# Patient Record
Sex: FEMALE | Race: OTHER
[De-identification: names, ages, dates, MRNs, and addresses within clinical notes are randomized per-mention and may not be internally consistent; named-entity substitution may affect disease eponyms.]

---
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
## 2019-01-04 ENCOUNTER — HOSPITAL ENCOUNTER (EMERGENCY)
Dept: HOSPITAL 61 - ER | Age: 19
Discharge: HOME | End: 2019-01-04
Payer: COMMERCIAL

## 2019-01-04 ENCOUNTER — HOSPITAL ENCOUNTER (OUTPATIENT)
Dept: HOSPITAL 61 - 3 SO LND | Age: 19
Setting detail: OBSERVATION
Discharge: HOME | End: 2019-01-04
Attending: SPECIALIST | Admitting: SPECIALIST
Payer: COMMERCIAL

## 2019-01-04 VITALS — DIASTOLIC BLOOD PRESSURE: 63 MMHG | SYSTOLIC BLOOD PRESSURE: 135 MMHG

## 2019-01-04 VITALS — BODY MASS INDEX: 37 KG/M2 | WEIGHT: 196 LBS | HEIGHT: 61 IN

## 2019-01-04 DIAGNOSIS — B37.9: ICD-10-CM

## 2019-01-04 DIAGNOSIS — Z3A.27: ICD-10-CM

## 2019-01-04 DIAGNOSIS — B96.89: ICD-10-CM

## 2019-01-04 DIAGNOSIS — N76.0: ICD-10-CM

## 2019-01-04 DIAGNOSIS — O23.42: Primary | ICD-10-CM

## 2019-01-04 DIAGNOSIS — O98.812: ICD-10-CM

## 2019-01-04 DIAGNOSIS — N89.8: ICD-10-CM

## 2019-01-04 DIAGNOSIS — O26.892: Primary | ICD-10-CM

## 2019-01-04 DIAGNOSIS — O23.592: ICD-10-CM

## 2019-01-04 LAB
AMPHETAMINE/METHAMPHETAMINE: (no result)
APTT PPP: YELLOW S
APTT PPP: YELLOW S
BACTERIA #/AREA URNS HPF: (no result) /HPF
BACTERIA #/AREA URNS HPF: (no result) /HPF
BARBITURATES UR-MCNC: (no result) UG/ML
BENZODIAZ UR-MCNC: (no result) UG/L
BILIRUB UR QL STRIP: NEGATIVE
BILIRUB UR QL STRIP: NEGATIVE
CANNABINOIDS UR-MCNC: (no result) UG/L
COCAINE UR-MCNC: (no result) NG/ML
FIBRINOGEN PPP-MCNC: CLEAR MG/DL
FIBRINOGEN PPP-MCNC: CLEAR MG/DL
METHADONE SERPL-MCNC: (no result) NG/ML
NITRITE UR QL STRIP: NEGATIVE
NITRITE UR QL STRIP: POSITIVE
OPIATES UR-MCNC: (no result) NG/ML
PCP SERPL-MCNC: (no result) MG/DL
PH UR STRIP: 6.5 [PH]
PH UR STRIP: 7 [PH]
PROT UR STRIP-MCNC: NEGATIVE MG/DL
PROT UR STRIP-MCNC: NEGATIVE MG/DL
RBC #/AREA URNS HPF: (no result) /HPF (ref 0–2)
RBC #/AREA URNS HPF: (no result) /HPF (ref 0–2)
SQUAMOUS #/AREA URNS LPF: (no result) /LPF
SQUAMOUS #/AREA URNS LPF: (no result) /LPF
UROBILINOGEN UR-MCNC: 0.2 MG/DL
UROBILINOGEN UR-MCNC: 0.2 MG/DL

## 2019-01-04 PROCEDURE — 81001 URINALYSIS AUTO W/SCOPE: CPT

## 2019-01-04 PROCEDURE — 80307 DRUG TEST PRSMV CHEM ANLYZR: CPT

## 2019-01-04 PROCEDURE — 81025 URINE PREGNANCY TEST: CPT

## 2019-01-04 PROCEDURE — 87086 URINE CULTURE/COLONY COUNT: CPT

## 2019-01-04 PROCEDURE — 87591 N.GONORRHOEAE DNA AMP PROB: CPT

## 2019-01-04 PROCEDURE — 87491 CHLMYD TRACH DNA AMP PROBE: CPT

## 2019-01-04 PROCEDURE — 87186 SC STD MICRODIL/AGAR DIL: CPT

## 2019-01-04 PROCEDURE — G0379 DIRECT REFER HOSPITAL OBSERV: HCPCS

## 2019-01-04 PROCEDURE — G0378 HOSPITAL OBSERVATION PER HR: HCPCS

## 2019-01-04 PROCEDURE — 99283 EMERGENCY DEPT VISIT LOW MDM: CPT

## 2019-01-04 NOTE — PHYS DOC
Past Medical History


Past Medical History:  No Pertinent History


Past Surgical History:  No Surgical History


Alcohol Use:  None


Drug Use:  None





Adult General


Chief Complaint


Chief Complaint:  VAGINAL PROBLEM





HPI


HPI





Patient is a 18  year old female with no significant medical history  1 

para 0 currently 27 weeks pregnant presenting to the ED today complaining of 

vaginal itching and thick white discharge for 2 days. Patient denies any 

concerns for STDs. She states she follows up with her OB/GYN for her pregnancy. 

Denies any abdominal pain, nausea or vomiting.





Review of Systems


Review of Systems





Constitutional: Denies fever or chills []


Eyes: Denies change in visual acuity, redness, or eye pain []


HENT: Denies nasal congestion or sore throat []


Respiratory: Denies cough or shortness of breath []


Cardiovascular: No additional information not addressed in HPI []


GI: Denies abdominal pain, nausea, vomiting, bloody stools or diarrhea []


: Reports vaginal itching and thick white vaginal discharge. Denies dysuria 

or hematuria []


Musculoskeletal: Denies back pain or joint pain []


Integument: Denies rash or skin lesions []


Neurologic: Denies headache, focal weakness or sensory changes []








All other systems were reviewed and found to be within normal limits, except as 

documented in this note.





Allergies


Allergies





Allergies








Coded Allergies Type Severity Reaction Last Updated Verified


 


  No Known Drug Allergies    4/20/15 No











Physical Exam


Physical Exam





Constitutional: Well developed, well nourished, no acute distress, non-toxic 

appearance. []


HENT: Normocephalic, atraumatic, bilateral external ears normal, oropharynx 

moist, no oral exudates, nose normal. []


Eyes: PERRLA, EOMI, conjunctiva normal, no discharge. [] 


Neck: Normal range of motion, no tenderness, supple, no stridor. [] 


Cardiovascular:Heart rate regular rhythm, no murmur []


Lungs & Thorax:  Bilateral breath sounds clear to auscultation []


Abdomen: Gravid abdomen. Bowel sounds normal, soft, no tenderness, no masses, 

no pulsatile masses. [] 


Pelvic exam


External pelvic appears normal, cervix is visualized and closed, moderate 

amount of fecal white discharge in the vaginal vault, no adnexal tenderness, no 

CMT.


Skin: Warm, dry, no erythema, no rash. [] 


Back: No tenderness, no CVA tenderness. [] 


Extremities: No tenderness, no cyanosis, no clubbing, ROM intact, no edema. [] 


Neurologic: Alert and oriented X 3, normal motor function, normal sensory 

function, no focal deficits noted. []


Psychologic: Affect normal, judgement normal, mood normal. []





Current Patient Data


Vital Signs





 Vital Signs








  Date Time  Temp Pulse Resp B/P (MAP) Pulse Ox O2 Delivery O2 Flow Rate FiO2


 


19 22:00 98.1 123 18 135/63 (87) 99 Room Air  





 98.1       








Lab Values





 Laboratory Tests








Test


 19


21:55 19


22:05


 


Urine Collection Type Unknown   


 


Urine Color Yellow   


 


Urine Clarity Clear   


 


Urine pH 6.5   


 


Urine Specific Gravity 1.010   


 


Urine Protein


 Negative mg/dL


(NEG-TRACE) 





 


Urine Glucose (UA)


 Negative mg/dL


(NEG) 





 


Urine Ketones (Stick)


 40 mg/dL (NEG)


 





 


Urine Blood Trace (NEG)   


 


Urine Nitrite


 Positive (NEG)


 





 


Urine Bilirubin


 Negative (NEG)


 





 


Urine Urobilinogen Dipstick


 0.2 mg/dL (0.2


mg/dL) 





 


Urine Leukocyte Esterase Large (NEG)   


 


Urine RBC


 Occ /HPF (0-2)


 





 


Urine WBC


 11-20 /HPF


(0-4) 





 


Urine Squamous Epithelial


Cells Mod /LPF  


 





 


Urine Bacteria


 Many /HPF


(0-FEW) 





 


POC Urine HCG, Qualitative


 


 Hcg positive


(Negative)








Microbiology


19 Wet Prep - Final, Complete


         








EKG


EKG


[]





Radiology/Procedures


Radiology/Procedures


[]





Course & Med Decision Making


Course & Med Decision Making


Pertinent Labs and Imaging studies reviewed. (See chart for details)





This is a 18-year-old female patient currently 27 weeks pregnant presenting to 

the ED today with vaginal discharge and itching for 2 days. Wet prep noted for 

BV, also noted for yeast infection. Urine analysis is noted for UTI. Discharged 

with cephalexin. Discharged with Flagyl. Patient instructed to increase dietary 

culture intake including yogurt and consider taking over-the-counter probiotics 

that is safe in pregnancy, also instructed to use over-the-counter Monistat. 

Follow-up with OB/GYN next week.





Dragon Disclaimer


Dragon Disclaimer


This electronic medical record was generated, in whole or in part, using a 

voice recognition dictation system.





Departure


Departure


Impression:  


 Primary Impression:  


 Urinary tract infection


 Additional Impressions:  


 Bacterial vaginosis


 Yeast infection


Disposition:   HOME, SELF-CARE


Condition:  STABLE


Referrals:  


ELAINA LUZ (PCP)


follow up next week with your OBGYN


Patient Instructions:  Bacterial Vaginosis, Easy-to-Read, Candida Infection, 

Adult, Urinary Tract Infection





Additional Instructions:  


You were evaluated in the emergency room and noted to have urinary tract 

infection, yeast infection and bacterial vaginosis. We put you on antibiotics 

for UTI and Bacterial vaginosis. For the yeast infection we highly recommend 

you increase your dietary culture intake through foods like yogurt. Also use 

over-the-counter Monistat to clear the yeast infection. Complete the 

antibiotics prescribed. Follow-up with your OB/GYN next week.


Scripts


Metronidazole (FLAGYL) 500 Mg Tablet


1 TAB PO BID, #14 TAB


   Prov: VINNY MCNEILL         19 


Cephalexin (CEPHALEXIN) 500 Mg Tablet


1 TAB PO BID, #14 TAB


   Prov: VINNY MCNEILL         19 


Miconazole/Skin Cleanser No.17 (MONISTAT 7 COMBINATION PACK) 1 Each Kit


1 EACH VG DAILY, #7 EACH


   Prov: VINNY MCNEILL         19





Problem Qualifiers








 Primary Impression:  


 Urinary tract infection


 Urinary tract infection type:  site unspecified  Hematuria presence:  without 

hematuria  Qualified Codes:  N39.0 - Urinary tract infection, site not specified








VINNY MCNEILL 2019 22:45

## 2019-07-14 ENCOUNTER — HOSPITAL ENCOUNTER (EMERGENCY)
Dept: HOSPITAL 61 - ER | Age: 19
Discharge: HOME | End: 2019-07-14
Payer: MEDICAID

## 2019-07-14 VITALS — DIASTOLIC BLOOD PRESSURE: 68 MMHG | SYSTOLIC BLOOD PRESSURE: 134 MMHG

## 2019-07-14 VITALS — WEIGHT: 196 LBS | BODY MASS INDEX: 37 KG/M2 | HEIGHT: 61 IN

## 2019-07-14 DIAGNOSIS — R20.2: Primary | ICD-10-CM

## 2019-07-14 DIAGNOSIS — R51: ICD-10-CM

## 2019-07-14 DIAGNOSIS — R20.0: ICD-10-CM

## 2019-07-14 PROCEDURE — 72125 CT NECK SPINE W/O DYE: CPT

## 2019-07-14 PROCEDURE — 99284 EMERGENCY DEPT VISIT MOD MDM: CPT

## 2019-07-14 PROCEDURE — 70450 CT HEAD/BRAIN W/O DYE: CPT

## 2019-07-14 PROCEDURE — 81025 URINE PREGNANCY TEST: CPT

## 2019-07-14 NOTE — RAD
Examination: CT head and cervical spine without contrast

 

HISTORY: History of bilateral hand numbness

 

 

CT HEAD

 

 

 

COMPARISON: None Available.

 

Exposure: One or more of the following individualized dose reduction 

techniques were utilized for this examination:  1. Automated exposure 

control  2. Adjustment of the mA and/or kV according to patient size  3. 

Use of iterative reconstruction technique

 

TECHNIQUE: 5 mm contiguous axial images were obtained from the skull base 

to the vertex in both bone and soft tissue algorithm.  

 

FINDINGS: 

No abnormal attenuation within the brain parenchyma.  

 

No evidence of acute intracranial hemorrhage.   No extra-axial fluid 

collections.

 

No mass effect or midline shift. Ventricular size is appropriate.  Basal 

cisterns are patent.

 

No fractures identified.Gray-white differentiation is preserved.Globes and

orbits are within normal limits.   Paranasal sinuses and mastoid air cells

are clear.   

 

IMPRESSION:

 

No acute intracranial findings. 

 

 

CT CERVICAL SPINE

 

 

 

COMPARISON: None Available.

 

Technique: 2.5 mm contiguous axial images were obtained from the skull 

base through the cervicothoracic junction in both bone and soft tissue 

algorithm.  Additional sagittal and coronal reconstructions were also 

performed. 

 

FINDINGS: Vertebral body height and alignment are maintained.  Cervical 

lordosis is preserved.  The lateral masses of C1 are aligned upon C2.  

 

No fractures identified.  The bony canal is patent throughout.

 

No significant degenerative changes are identified.  

 

The paraspinous soft tissues are unremarkable.  Visualized intracranial 

contents are unremarkable.  Lung apices are clear. 

 

IMPRESSION:

Unremarkable CT examination of the cervical spine, as above.  

Specifically, no fractures are seen.  

 

Electronically signed by: Marv Albright MD (7/14/2019 12:38 PM) [facility]

## 2019-07-14 NOTE — PHYS DOC
Past Medical History


Past Medical History:  No Pertinent History


Past Surgical History:  No Surgical History


Alcohol Use:  None


Drug Use:  None





Adult General


Chief Complaint


Chief Complaint:  HAND PROBLEM





HPI


HPI





Patient is a 18  year old female who presents with [pinning of hands numbness. 

Patient complaining of numbness of bilateral tip of fingers for 3 weeks that 

usually happens when she using her hands and resolved with rest. Patient denies 

weakness, nausea and vomiting, fever and chills, headache, pregnancy.





Review of Systems


Review of Systems





Constitutional: Denies fever or chills []


Eyes: Denies change in visual acuity, redness, or eye pain []


HENT: Denies nasal congestion or sore throat []


Respiratory: Denies cough or shortness of breath []


Cardiovascular: No additional information not addressed in HPI []


GI: Denies abdominal pain, nausea, vomiting, bloody stools or diarrhea []


: Denies dysuria or hematuria []


Musculoskeletal: Denies back pain or joint pain []


Integument: Denies rash or skin lesions []


Neurologic: Denies headache, focal weakness or sensory changes []


Endocrine: Denies polyuria or polydipsia []





All other systems were reviewed and found to be within normal limits, except as 

documented in this note.





Allergies


Allergies





Allergies








Coded Allergies Type Severity Reaction Last Updated Verified


 


  No Known Drug Allergies    4/20/15 No











Physical Exam


Physical Exam





Constitutional: Well developed, well nourished, no acute distress, non-toxic 

appearance. []


HENT: Normocephalic, atraumatic, bilateral external ears normal, oropharynx 

moist, no oral exudates, nose normal. []


Eyes: PERRLA, EOMI, conjunctiva normal, no discharge. [] 


Neck: Normal range of motion, no tenderness, supple, no stridor. [] 


Cardiovascular:Heart rate regular rhythm, no murmur []


Lungs & Thorax:  Bilateral breath sounds clear to auscultation []


Abdomen: Bowel sounds normal, soft, no tenderness, no masses, no pulsatile 

masses. [] 


Skin: Warm, dry, no erythema, no rash. [] 


Back: No tenderness, no CVA tenderness. [] 


Extremities: No tenderness, no cyanosis, no clubbing, ROM intact, no edema. [] 


Neurologic: Alert and oriented X 3, normal motor function, normal sensory 

function, no focal deficits noted. []


Psychologic: Affect normal, judgement normal, mood normal. []





Current Patient Data


Vital Signs





                                   Vital Signs








  Date Time  Temp Pulse Resp B/P (MAP) Pulse Ox O2 Delivery O2 Flow Rate FiO2


 


19 11:14 98.2 102 16 134/68 (90) 96 Room Air  





 98.2       








Lab Values





                                Laboratory Tests








Test


 19


12:06


 


POC Urine HCG, Qualitative


 Hcg negative


(Negative)











EKG


EKG


[]





Radiology/Procedures


Radiology/Procedures


                            [facility]


                    [address] [place] [address]


                                 [phone]


                                        


                                 IMAGING REPORT





                                     Signed





PATIENT: MOE TINEO   ACCOUNT: [id number]     MRN#: [id number]


: 2000           LOCATION: ER              AGE: 18


SEX: F                    EXAM DT: 19         ACCESSION#: [id number]


STATUS: REG ER            ORD. PHYSICIAN: KILLIAN FRAGA MD   


REASON: bilateral  hands numbness


PROCEDURE: CT HEAD AND CERVICAL SPINE WO





Examination: CT head and cervical spine without contrast


 


HISTORY: History of bilateral hand numbness


 


 


CT HEAD


 


 


 


COMPARISON: None Available.


 


Exposure: One or more of the following individualized dose reduction 


techniques were utilized for this examination:  1. Automated exposure 


control  2. Adjustment of the mA and/or kV according to patient size  3. 


Use of iterative reconstruction technique


 


TECHNIQUE: 5 mm contiguous axial images were obtained from the skull base 


to the vertex in both bone and soft tissue algorithm.  


 


FINDINGS: 


No abnormal attenuation within the brain parenchyma.  


 


No evidence of acute intracranial hemorrhage.   No extra-axial fluid 


collections.


 


No mass effect or midline shift. Ventricular size is appropriate.  Basal 


cisterns are patent.


 


No fractures identified.Gray-white differentiation is preserved.Globes and


orbits are within normal limits.   Paranasal sinuses and mastoid air cells


are clear.   


 


IMPRESSION:


 


No acute intracranial findings. 


 


 


CT CERVICAL SPINE


 


 


 


COMPARISON: None Available.


 


Technique: 2.5 mm contiguous axial images were obtained from the skull 


base through the cervicothoracic junction in both bone and soft tissue 


algorithm.  Additional sagittal and coronal reconstructions were also 


performed. 


 


FINDINGS: Vertebral body height and alignment are maintained.  Cervical 


lordosis is preserved.  The lateral masses of C1 are aligned upon C2.  


 


No fractures identified.  The bony canal is patent throughout.


 


No significant degenerative changes are identified.  


 


The paraspinous soft tissues are unremarkable.  Visualized intracranial 


contents are unremarkable.  Lung apices are clear. 


 


IMPRESSION:


Unremarkable CT examination of the cervical spine, as above.  


Specifically, no fractures are seen.  


 


Electronically signed by: Marv Albright MD (2019 12:38 PM) [facility]














DICTATED and SIGNED BY:     MARV ALBRIGHT MD


DATE:     19 1238





Course & Med Decision Making


Course & Med Decision Making


Pertinent Labs and Imaging studies reviewed. (See chart for details)





Evaluation of patient in ER showed 18-year-old female patient with history of 

frequent emergency room visits complaining of bilateral hand paresthesia for 3 

weeks. Patient had unremarkable physical exam and CT head and cervical spine and

 was advised to follow up with her primary care physician if continues to have 

problem.





I've spoken with the patient and/or caregivers. I've explained the patient's c

ondition, diagnosis and treatment plan based on information available to me at 

this time. I've answered the patient's and/or caregivers questions and addressed

 any concerns. The patient and/or caregivers have a good understanding the 

patient's diagnosis, condition and treatment plan as can be expected at this 

point. Vital signs have been stabilized. The patient's condition is stable for 

discharge from the emergency department.





The patient will pursue further outpatient evaluation with her primary care prov

ider or other designated consulting physician as outlined in the discharge 

instructions. Patient and/or caregivers are agreeable to this plan of care and 

follow-up instructions have been explained in detail. The patient and/or 

caregivers have received these instructions in written format and expressed 

understanding of these discharge instructions. The patient and her caregivers 

are aware that if any significant change in condition or worsening of symptoms 

should prompt him to immediately return to this of the closest emergency 

department.  If an emergent department is not readily available I would 

encourage him to call 911.





Fritz Disclaimer


Dragon Disclaimer


This electronic medical record was generated, in whole or in part, using a voice

 recognition dictation system.





Departure


Departure


Impression:  


   Primary Impression:  


   Paresthesia


Disposition:   HOME, SELF-CARE (at 1327)


Condition:  STABLE


Referrals:  


NO PCP (PCP)


Patient Instructions:  Paresthesia





Additional Instructions:  


Drink plenty of liquids


Follow-up with your primary care physician in 3-5 days


Return to ER if not getting better


Scripts


Thiamine HCl (B-1) 100 Mg Tablet


100 MG PO BID, #30 TAB


   Prov: KILLAIN FRAGA MD         19











KILLIAN FRAGA MD             2019 11:51

## 2020-07-08 ENCOUNTER — HOSPITAL ENCOUNTER (EMERGENCY)
Dept: HOSPITAL 61 - ER | Age: 20
Discharge: HOME | End: 2020-07-08
Payer: SELF-PAY

## 2020-07-08 VITALS — DIASTOLIC BLOOD PRESSURE: 73 MMHG | SYSTOLIC BLOOD PRESSURE: 139 MMHG

## 2020-07-08 VITALS — HEIGHT: 61 IN | BODY MASS INDEX: 33.3 KG/M2 | WEIGHT: 176.37 LBS
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
## 2020-07-08 DIAGNOSIS — S61.411A: ICD-10-CM
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
## 2020-07-08 DIAGNOSIS — S61.212A: Primary | ICD-10-CM
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
## 2020-07-08 PROCEDURE — 90471 IMMUNIZATION ADMIN: CPT

## 2020-07-08 PROCEDURE — 90715 TDAP VACCINE 7 YRS/> IM: CPT

## 2020-07-08 PROCEDURE — 12002 RPR S/N/AX/GEN/TRNK2.6-7.5CM: CPT

## 2020-07-08 PROCEDURE — 73130 X-RAY EXAM OF HAND: CPT

## 2020-07-08 PROCEDURE — 99283 EMERGENCY DEPT VISIT LOW MDM: CPT

## 2020-07-08 NOTE — PHYS DOC
Past Medical History


Past Medical History:  No Pertinent History


Past Surgical History:  No Surgical History


Smoking Status:  Never Smoker


Alcohol Use:  None


Drug Use:  None





General Adult


EDM:


Chief Complaint:  LACERATION/AVULSION





HPI:


HPI:





Patient is a 19  year old female who presents with laceration to right hand 

after punching a mirror today.  Patient rates pain 6 out of 10 right third 

knuckle.  Patient denies any radiation of pain, no alleviating or aggravating 

factors. she did not take anything for pain prior to arrival.  Patient is unsure

when her last tetanus shot was.





Review of Systems:


Review of Systems:





Musculoskeletal:   Denies back pain, see HPI [] 


Integument:   See HPI


Psychiatric:  Denies depression or anxiety. []





Heart Score:


Risk Factors:


Risk Factors:  DM, Current or recent (<one month) smoker, HTN, HLP, family 

history of CAD, obesity.


Risk Scores:


Score 0 - 3:  2.5% MACE over next 6 weeks - Discharge Home


Score 4 - 6:  20.3% MACE over next 6 weeks - Admit for Clinical Observation


Score 7 - 10:  72.7% MACE over next 6 weeks - Early Invasive Strategies





Allergies:


Allergies:





Allergies








Coded Allergies Type Severity Reaction Last Updated Verified


 


  No Known Drug Allergies    4/20/15 No











Physical Exam:


PE:


Constitutional: Well developed, well nourished, no acute distress, non-toxic 

appearance. []


HENT: Normocephalic, atraumatic, bilateral external ears normal, nose normal. []


Eyes: PERRLA, EOMI, conjunctiva normal, no discharge. [] 


Neck: Normal range of motion, no stridor. [] 


Cardiovascular:Heart rate regular rhythm


Lungs & Thorax:  Respirations even and unlabored, no retractions, no respiratory

 distress


Skin: Warm, dry, no erythema, no rash; multiple small lacerations to dorsal 

aspect of right hand: lac #1- 3 cm laceration to lateral aspect of right hand 

with active bleeding noted, no visible foreign body, #2- 2 cm vertical 

laceration proximal to the volar aspect of the third digit without active 

bleeding or visible foreign body, #3- 1 cm horizontal laceration just proximal 

to laceration #2 without active bleeding or visible foreign body, #4 0.5 cm 

horizontal laceration lateral to proximal aspect of laceration number 2 


Extremities: No cyanosis, ROM intact, minimal swelling noted to MCP joint of 

right third finger [] 


Neurologic: Alert and oriented X 3, no focal deficits noted. []


Psychologic: Affect normal, judgement normal, mood normal. []





EKG:


EKG:


[]





Radiology/Procedures:


Radiology/Procedures:


PROCEDURE: HAND RIGHT 3V





Right hand 3 views.


 


HISTORY: Punched a mirror


 


3 views were taken of the hand. There is soft tissue injury along the 


lateral aspect of the hand. 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 


congenital. There is no fracture or acute osseous abnormality.


 


IMPRESSION:


1. Soft tissue injury right hand.


2. No acute fracture.


3. Short fifth metacarpal.


 





Laceration Repair by me:





Anesthesia:         1% lidocaine locally


Location:         #1 lateral right hand


Tendon/Joint/Nerves:      No injury


Foreign body:         None detected after copious irrigation and exploration


Technique:         9 Simple Interrupted Sutures with 4-0 Prolene


Complexity:          No subcutaneous sutures/mucosal repair/edge excision


Post Closure Length:      4 cm





Anesthesia:         1% lidocaine locally


Location:         #2 proximal to 3rd digit of right hand


Tendon/Joint/Nerves:      No injury


Foreign body:         None detected after copious irrigation and exploration


Technique:         4 Simple Interrupted Sutures with 4-0 Prolene


Complexity:          No subcutaneous sutures/mucosal repair/edge excision


Post Closure Length:      *** cm





Anesthesia:         1% lidocaine locally


Location:         #3 proximal to laceration #2


Tendon/Joint/Nerves:      No injury


Foreign body:         None detected after copious irrigation and exploration


Technique:         2 Simple Interrupted Sutures with 4-0 Prolene


Complexity:          No subcutaneous sutures/mucosal repair/edge excision


Post Closure Length:      1 cm





Anesthesia:         1% lidocaine locally


Location:         #4 medial to proximal laceration #2


Tendon/Joint/Nerves:      No injury


Foreign body:         None detected after copious irrigation and exploration


Technique:         1 Simple Interrupted Sutures with 4-0 Prolene


Complexity:          No subcutaneous sutures/mucosal repair/edge excision


Post Closure Length:      0.5 cm





Patient's bleeding was easily controlled in the department and there is no 

indication of anemia.


No evidence of compartment syndrome, neurologic injury, vascular injury, open 

joint, tendon laceration, or foreign body.


Patient is appropriate for outpatient follow up.


























[]





Course & Med Decision Making:


Course & Med Decision Making


Pertinent Labs and Imaging studies reviewed. (See chart for details)








Patient is a 19-year-old female coming in for laceration to her right hand after

 she punched a mirror today.  Will clean wounds, suture laceration, administer 

tetanus shot.  Prescription written for Keflex 500 mg 3 times daily x5 days.  

Recommend the patient clean the area as affected and apply antibiotic ointment 

twice daily.  Sutures out in 14 days, may take Tylenol or ibuprofen as needed 

for pain.





Patient verbalized an understanding of home care, medications, follow-up, and 

return to ED instructions and was in agreement with the plan of care.


[]





Dragon Disclaimer:


Dragon Disclaimer:


This electronic medical record was generated, in whole or in part, using a voice

 recognition dictation system.





Departure


Departure


Impression:  


   Primary Impression:  


   Need for Tdap vaccination


   Additional Impression:  


   Multiple lacerations


Disposition:  01 HOME, SELF-CARE


Condition:  STABLE


Referrals:  


NO PCP (PCP)


Patient Instructions:  Laceration Care, Adult, Easy-to-Read





Additional Instructions:  


Fill the prescription and use as directed. Keep the affected areas clean and 

dry. You may take Tylenol or ibuprofen as needed for pain. Keep the dressing 

that was placed today on for 24 hours then change the dressing twice a day and 

apply antibiotic ointment to the area. Follow-up with your primary care doctor, 

or return to the emergency room in 14 days to have the sutures removed, sooner 

if you develop signs of infection including: redness, warmth, drainage, or a 

fever.


Scripts


Cephalexin (KEFLEX) 500 Mg Capsule


500 MG PO TID for 7 Days, #21 CAP 0 Refills


   Prov: ACE PETERSON         7/8/20





Justicifation of Admission Dx:


Justifications for Admission:


Justification of Admission Dx:  N/A











ACE PETERSON APRN        Jul 8, 2020 14:16

## 2020-07-08 NOTE — RAD
Right hand 3 views.

 

HISTORY: Punched a mirror

 

3 views were taken of the hand. There is soft tissue injury along the 

lateral aspect of the hand. 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 

congenital. There is no fracture or acute osseous abnormality.

 

IMPRESSION:

1. Soft tissue injury right hand.

2. No acute fracture.

3. Short fifth metacarpal.

 

Electronically signed by: Regino Mae MD (7/8/2020 2:38 PM) [facility]

## 2020-12-10 ENCOUNTER — HOSPITAL ENCOUNTER (OUTPATIENT)
Dept: HOSPITAL 61 - ER | Age: 20
Setting detail: OBSERVATION
LOS: 1 days | Discharge: HOME | End: 2020-12-11
Attending: OBSTETRICS & GYNECOLOGY | Admitting: OBSTETRICS & GYNECOLOGY
Payer: SELF-PAY

## 2020-12-10 VITALS — DIASTOLIC BLOOD PRESSURE: 68 MMHG | SYSTOLIC BLOOD PRESSURE: 106 MMHG

## 2020-12-10 VITALS — DIASTOLIC BLOOD PRESSURE: 63 MMHG | SYSTOLIC BLOOD PRESSURE: 107 MMHG

## 2020-12-10 VITALS — DIASTOLIC BLOOD PRESSURE: 63 MMHG | SYSTOLIC BLOOD PRESSURE: 102 MMHG

## 2020-12-10 VITALS — DIASTOLIC BLOOD PRESSURE: 56 MMHG | SYSTOLIC BLOOD PRESSURE: 122 MMHG

## 2020-12-10 VITALS — SYSTOLIC BLOOD PRESSURE: 125 MMHG | DIASTOLIC BLOOD PRESSURE: 47 MMHG

## 2020-12-10 VITALS — SYSTOLIC BLOOD PRESSURE: 100 MMHG | DIASTOLIC BLOOD PRESSURE: 62 MMHG

## 2020-12-10 VITALS — DIASTOLIC BLOOD PRESSURE: 73 MMHG | SYSTOLIC BLOOD PRESSURE: 120 MMHG

## 2020-12-10 VITALS — SYSTOLIC BLOOD PRESSURE: 106 MMHG | DIASTOLIC BLOOD PRESSURE: 68 MMHG

## 2020-12-10 VITALS — SYSTOLIC BLOOD PRESSURE: 136 MMHG | DIASTOLIC BLOOD PRESSURE: 37 MMHG

## 2020-12-10 VITALS — DIASTOLIC BLOOD PRESSURE: 63 MMHG | SYSTOLIC BLOOD PRESSURE: 112 MMHG

## 2020-12-10 VITALS — SYSTOLIC BLOOD PRESSURE: 125 MMHG | DIASTOLIC BLOOD PRESSURE: 89 MMHG

## 2020-12-10 VITALS — SYSTOLIC BLOOD PRESSURE: 108 MMHG | DIASTOLIC BLOOD PRESSURE: 63 MMHG

## 2020-12-10 VITALS — HEIGHT: 61.5 IN | WEIGHT: 156.31 LBS | BODY MASS INDEX: 29.13 KG/M2

## 2020-12-10 VITALS — SYSTOLIC BLOOD PRESSURE: 105 MMHG | DIASTOLIC BLOOD PRESSURE: 64 MMHG

## 2020-12-10 VITALS — DIASTOLIC BLOOD PRESSURE: 64 MMHG | SYSTOLIC BLOOD PRESSURE: 115 MMHG

## 2020-12-10 VITALS — SYSTOLIC BLOOD PRESSURE: 105 MMHG | DIASTOLIC BLOOD PRESSURE: 65 MMHG

## 2020-12-10 VITALS — SYSTOLIC BLOOD PRESSURE: 135 MMHG | DIASTOLIC BLOOD PRESSURE: 49 MMHG

## 2020-12-10 VITALS — SYSTOLIC BLOOD PRESSURE: 100 MMHG | DIASTOLIC BLOOD PRESSURE: 78 MMHG

## 2020-12-10 DIAGNOSIS — O00.90: Primary | ICD-10-CM

## 2020-12-10 DIAGNOSIS — Z3A.01: ICD-10-CM

## 2020-12-10 DIAGNOSIS — R73.9: ICD-10-CM

## 2020-12-10 DIAGNOSIS — O23.41: ICD-10-CM

## 2020-12-10 DIAGNOSIS — Z20.828: ICD-10-CM

## 2020-12-10 DIAGNOSIS — Z23: ICD-10-CM

## 2020-12-10 DIAGNOSIS — N83.8: ICD-10-CM

## 2020-12-10 DIAGNOSIS — O26.891: ICD-10-CM

## 2020-12-10 LAB
ALBUMIN SERPL-MCNC: 3.3 G/DL (ref 3.4–5)
ALBUMIN/GLOB SERPL: 0.9 {RATIO} (ref 1–1.7)
ALP SERPL-CCNC: 42 U/L (ref 46–116)
ALT SERPL-CCNC: 13 U/L (ref 14–59)
ANION GAP SERPL CALC-SCNC: 12 MMOL/L (ref 6–14)
APTT PPP: YELLOW S
AST SERPL-CCNC: 12 U/L (ref 15–37)
BACTERIA #/AREA URNS HPF: (no result) /HPF
BASOPHILS # BLD AUTO: 0.1 X10^3/UL (ref 0–0.2)
BASOPHILS NFR BLD: 0 % (ref 0–3)
BILIRUB SERPL-MCNC: 0.2 MG/DL (ref 0.2–1)
BILIRUB UR QL STRIP: NEGATIVE
BUN SERPL-MCNC: 7 MG/DL (ref 7–20)
BUN/CREAT SERPL: 10 (ref 6–20)
CALCIUM SERPL-MCNC: 8.4 MG/DL (ref 8.5–10.1)
CHLORIDE SERPL-SCNC: 103 MMOL/L (ref 98–107)
CO2 SERPL-SCNC: 25 MMOL/L (ref 21–32)
CREAT SERPL-MCNC: 0.7 MG/DL (ref 0.6–1)
EOSINOPHIL NFR BLD: 0.1 X10^3/UL (ref 0–0.7)
EOSINOPHIL NFR BLD: 1 % (ref 0–3)
ERYTHROCYTE [DISTWIDTH] IN BLOOD BY AUTOMATED COUNT: 16.1 % (ref 11.5–14.5)
FIBRINOGEN PPP-MCNC: CLEAR MG/DL
GFR SERPLBLD BASED ON 1.73 SQ M-ARVRAT: 105.6 ML/MIN
GLUCOSE SERPL-MCNC: 209 MG/DL (ref 70–99)
HCT VFR BLD CALC: 28.5 % (ref 36–47)
HCT VFR BLD CALC: 32.5 % (ref 36–47)
HGB BLD-MCNC: 10 G/DL (ref 12–15.5)
HGB BLD-MCNC: 10.9 G/DL (ref 12–15.5)
LIPASE: 75 U/L (ref 73–393)
LYMPHOCYTES # BLD: 5 X10^3/UL (ref 1–4.8)
LYMPHOCYTES NFR BLD AUTO: 33 % (ref 24–48)
MCH RBC QN AUTO: 30 PG (ref 25–35)
MCHC RBC AUTO-ENTMCNC: 34 G/DL (ref 31–37)
MCHC RBC AUTO-ENTMCNC: 35 G/DL (ref 31–37)
MCV RBC AUTO: 89 FL (ref 79–100)
MONO #: 1 X10^3/UL (ref 0–1.1)
MONOCYTES NFR BLD: 7 % (ref 0–9)
NEUT #: 9.1 X10^3/UL (ref 1.8–7.7)
NEUTROPHILS NFR BLD AUTO: 59 % (ref 31–73)
NITRITE UR QL STRIP: POSITIVE
PH UR STRIP: 5.5 [PH]
PLATELET # BLD AUTO: 492 X10^3/UL (ref 140–400)
POTASSIUM SERPL-SCNC: 3.3 MMOL/L (ref 3.5–5.1)
PROT SERPL-MCNC: 7 G/DL (ref 6.4–8.2)
PROT UR STRIP-MCNC: 30 MG/DL
RBC # BLD AUTO: 3.64 X10^6/UL (ref 3.5–5.4)
RBC #/AREA URNS HPF: (no result) /HPF (ref 0–2)
SODIUM SERPL-SCNC: 140 MMOL/L (ref 136–145)
UROBILINOGEN UR-MCNC: 0.2 MG/DL
WBC # BLD AUTO: 15.3 X10^3/UL (ref 4–11)
WBC #/AREA URNS HPF: (no result) /HPF (ref 0–4)

## 2020-12-10 PROCEDURE — 87426 SARSCOV CORONAVIRUS AG IA: CPT

## 2020-12-10 PROCEDURE — 85014 HEMATOCRIT: CPT

## 2020-12-10 PROCEDURE — 83690 ASSAY OF LIPASE: CPT

## 2020-12-10 PROCEDURE — 59151 TREAT ECTOPIC PREGNANCY: CPT

## 2020-12-10 PROCEDURE — G0379 DIRECT REFER HOSPITAL OBSERV: HCPCS

## 2020-12-10 PROCEDURE — U0003 INFECTIOUS AGENT DETECTION BY NUCLEIC ACID (DNA OR RNA); SEVERE ACUTE RESPIRATORY SYNDROME CORONAVIRUS 2 (SARS-COV-2) (CORONAVIRUS DISEASE [COVID-19]), AMPLIFIED PROBE TECHNIQUE, MAKING USE OF HIGH THROUGHPUT TECHNOLOGIES AS DESCRIBED BY CMS-2020-01-R: HCPCS

## 2020-12-10 PROCEDURE — 76801 OB US < 14 WKS SINGLE FETUS: CPT

## 2020-12-10 PROCEDURE — 86901 BLOOD TYPING SEROLOGIC RH(D): CPT

## 2020-12-10 PROCEDURE — 99284 EMERGENCY DEPT VISIT MOD MDM: CPT

## 2020-12-10 PROCEDURE — 81025 URINE PREGNANCY TEST: CPT

## 2020-12-10 PROCEDURE — 86850 RBC ANTIBODY SCREEN: CPT

## 2020-12-10 PROCEDURE — 86920 COMPATIBILITY TEST SPIN: CPT

## 2020-12-10 PROCEDURE — P9016 RBC LEUKOCYTES REDUCED: HCPCS

## 2020-12-10 PROCEDURE — 85025 COMPLETE CBC W/AUTO DIFF WBC: CPT

## 2020-12-10 PROCEDURE — 87086 URINE CULTURE/COLONY COUNT: CPT

## 2020-12-10 PROCEDURE — 36415 COLL VENOUS BLD VENIPUNCTURE: CPT

## 2020-12-10 PROCEDURE — 96376 TX/PRO/DX INJ SAME DRUG ADON: CPT

## 2020-12-10 PROCEDURE — 96375 TX/PRO/DX INJ NEW DRUG ADDON: CPT

## 2020-12-10 PROCEDURE — 36430 TRANSFUSION BLD/BLD COMPNT: CPT

## 2020-12-10 PROCEDURE — 90686 IIV4 VACC NO PRSV 0.5 ML IM: CPT

## 2020-12-10 PROCEDURE — 87186 SC STD MICRODIL/AGAR DIL: CPT

## 2020-12-10 PROCEDURE — G0378 HOSPITAL OBSERVATION PER HR: HCPCS

## 2020-12-10 PROCEDURE — 76817 TRANSVAGINAL US OBSTETRIC: CPT

## 2020-12-10 PROCEDURE — 90471 IMMUNIZATION ADMIN: CPT

## 2020-12-10 PROCEDURE — 86900 BLOOD TYPING SEROLOGIC ABO: CPT

## 2020-12-10 PROCEDURE — 84702 CHORIONIC GONADOTROPIN TEST: CPT

## 2020-12-10 PROCEDURE — 96374 THER/PROPH/DIAG INJ IV PUSH: CPT

## 2020-12-10 PROCEDURE — 80053 COMPREHEN METABOLIC PANEL: CPT

## 2020-12-10 PROCEDURE — 85018 HEMOGLOBIN: CPT

## 2020-12-10 PROCEDURE — 87077 CULTURE AEROBIC IDENTIFY: CPT

## 2020-12-10 PROCEDURE — 96361 HYDRATE IV INFUSION ADD-ON: CPT

## 2020-12-10 PROCEDURE — 81001 URINALYSIS AUTO W/SCOPE: CPT

## 2020-12-10 RX ADMIN — OXYCODONE HYDROCHLORIDE AND ACETAMINOPHEN PRN TAB: 5; 325 TABLET ORAL at 23:06

## 2020-12-10 RX ADMIN — GABAPENTIN SCH MG: 300 CAPSULE ORAL at 23:00

## 2020-12-10 RX ADMIN — OXYCODONE HYDROCHLORIDE AND ACETAMINOPHEN PRN TAB: 5; 325 TABLET ORAL at 19:33

## 2020-12-10 RX ADMIN — SIMETHICONE CHEW TAB 80 MG PRN MG: 80 TABLET ORAL at 19:32

## 2020-12-10 RX ADMIN — KETOROLAC TROMETHAMINE PRN MG: 30 INJECTION, SOLUTION INTRAMUSCULAR at 14:40

## 2020-12-10 RX ADMIN — KETOROLAC TROMETHAMINE PRN MG: 30 INJECTION, SOLUTION INTRAMUSCULAR at 20:51

## 2020-12-10 RX ADMIN — GABAPENTIN SCH MG: 300 CAPSULE ORAL at 14:40

## 2020-12-10 RX ADMIN — OXYCODONE HYDROCHLORIDE AND ACETAMINOPHEN PRN TAB: 5; 325 TABLET ORAL at 10:59

## 2020-12-10 RX ADMIN — SIMETHICONE CHEW TAB 80 MG PRN MG: 80 TABLET ORAL at 23:06

## 2020-12-10 RX ADMIN — OXYCODONE HYDROCHLORIDE AND ACETAMINOPHEN PRN TAB: 5; 325 TABLET ORAL at 18:04

## 2020-12-10 NOTE — OP
DATE OF SURGERY:  



PREOPERATIVE DIAGNOSIS:  Ruptured ectopic pregnancy.



POSTOPERATIVE DIAGNOSIS:  Ruptured ectopic pregnancy.



PROCEDURE:  Laparoscopic right salpingectomy.



SURGEON:  Zach Farias MD.



ANESTHESIA:  GETA.



ESTIMATED BLOOD LOSS:  1000 mL.



COMPLICATIONS:  None.



FINDINGS:  Ruptured ectopic pregnancy on the right side, 1000 mL blood clot in

the abdomen, normal ovaries bilaterally, normal left fallopian tube.



SUMMARY:  A 20-year-old  2, para 1 with early gestation, presented with

severe right lower quadrant pain that continued to worsen.  Sonogram indicated

ruptured ectopic pregnancy with moderate amount of blood in the abdomen.  The

patient was counseled on the risks, benefits and expectations of laparoscopic

right salpingectomy and voiced clear understanding to proceed.



DESCRIPTION OF PROCEDURE:  The patient was taken to surgery suite and placed in

dorsal lithotomy position, was prepped with Betadine solution for vaginal prep

and ChloraPrep for abdominal prep.  After adequate anesthesia, bivalve speculum

was placed vaginally.  Anterior lip of the cervix grasped with single tooth

tenaculum.  The uterine acorn manipulator was then placed.  The bivalve speculum

was removed.  Attention was now placed on abdomen.



Small transverse skin incision was made just below the umbilicus with a scalpel.

 The Veress needle was then placed through the infraumbilical incision site. 

The abdomen was allowed to insufflate up to 1-1/2 liters CO2 gas.  The Veress

needle was then removed, 5 mm trocar was placed.  Scope was positioned.  There

was a large amount of blood clot in the abdomen.  Two additional incisions were

made, 1 in the left lower quadrant in which a 5 mm trocar was placed, 1 at the

midline 2 fingerbreadths above the pubic symphysis in which an 11 mm trocar was

placed.  Suction irrigation was utilized to remove the majority of the blood

clot and blood.  The uterus was then visualized.  Left fallopian tube and ovary

appeared normal.  The right fallopian tube contained ectopic pregnancy with

active bleeding.  Right ovary appeared normal.  There was also a paratubal cyst

on the right fallopian tube.  With the aid of graspers and the EnSeal device,

distal salpingectomy was performed removing the right fallopian tube and ectopic

pregnancy.  This was removed through the Endobag.  The adnexa was hemostatic. 

Suction irrigation was utilized to verify good hemostasis.  Small amount of

normal saline was left in posterior cul-de-sac.  The trocars were then removed

under direct visualization.  The abdomen was allowed to deflate as much as

possible along with mechanical manipulation.  The 11 mm port site was closed at

the fascial layer using 2-0 Vicryl suture in figure-of-eight manner.  The 3 skin

incisions were closed at the skin level using 4-0 Vicryl suture in a

subcuticular manner.  A 0.25% Marcaine with epinephrine was injected at each

incision site.  The patient tolerated the procedure well and was taken to

recovery room in stable condition.  Sponge and needle count correct x 3.

 



______________________________

ZACH FARIAS MD



DR:  GABE/misael  JOB#:  [id number] / [id number]

DD:  12/10/2020 08:49  DT:  12/10/2020 09:01

## 2020-12-10 NOTE — PDOC
BRIEF OPERATIVE NOTE


Date:  Dec 10, 2020


Pre-Op Diagnosis


Ruptured Ectopic Pregnancy


Post-Op Diagnosis


SAme


Procedure Performed


[facility] Right Salpingectomy


Surgeon


Dr. Farias


Anesthesia Type:  General


Blood Loss


1000 ml


Specimens Obtained


Right fallopian tube with ectopic pregnancy


Findings


ruptured ectopic pregnancy with 1,000 ml blood clot; nml ovaries jair., nml Left 

fallopian tube


Complications


none


Operative Note


see dictation











DEEPALI FARIAS Jr, MD          Dec 10, 2020 08:49

## 2020-12-10 NOTE — PHYS DOC
Past Medical History


Past Medical History:  No Pertinent History


 (ALFRED CALLEJAS MD)


Past Surgical History:  No Surgical History


 (ALFRED CALLEJAS MD)


Smoking Status:  Never Smoker


Alcohol Use:  None


Drug Use:  None


 (ALFRED CALLEJAS MD)





General Adult


EDM:


Chief Complaint:  ABDOMINAL PAIN





HPI:


HPI:





Patient is a 20  year old female who arrives with a chief complaint of abdominal

pain.  Patient was in her normal state health until approximately 30 minutes ago

when she began having sharp stabbing diffuse nonradiating abdominal pain.  Pat

ient states the pain is currently 10 out of 10.  Pain is worse with palpation.  

Patient had nausea but no vomiting.  Patient has any fevers, chills, cough, 

shortness of breath.  Patient denies any dysuria.  Patient's last menstrual 

period was 1 day ago.


 (ALFRED CALLEJAS MD)





Review of Systems:


Review of Systems:


Constitutional:   Denies fever or chills. []


Eyes:   Denies change in visual acuity. []


HENT:   Denies nasal congestion or sore throat. [] 


Respiratory:   Denies cough or shortness of breath. [] 


Cardiovascular:   Denies chest pain or edema. [] 


GI:   Complains abdominal pain with nausea but no vomiting diarrhea or blood in 

stool


:  Denies dysuria. [] 


Musculoskeletal:   Denies back pain or joint pain. [] 


Integument:   Denies rash. [] 


Neurologic:   Denies headache, focal weakness or sensory changes. [] 


Endocrine:   Denies polyuria or polydipsia. [] 


Lymphatic:  Denies swollen glands. [] 


Psychiatric:  Denies depression or anxiety. []


 (ALFRED CALLEJAS MD)





Heart Score:


Risk Factors:


Risk Factors:  DM, Current or recent (<one month) smoker, HTN, HLP, family 

history of CAD, obesity.


Risk Scores:


Score 0 - 3:  2.5% MACE over next 6 weeks - Discharge Home


Score 4 - 6:  20.3% MACE over next 6 weeks - Admit for Clinical Observation


Score 7 - 10:  72.7% MACE over next 6 weeks - Early Invasive Strategies


 (ALFRED CALLEJAS MD)





Allergies:


Allergies:





Allergies








Coded Allergies Type Severity Reaction Last Updated Verified


 


  No Known Drug Allergies    4/20/15 No








 (ALFRED CALLEJAS MD)





Physical Exam:


PE:





Constitutional: Well developed, well nourished, no acute distress, non-toxic 

appearance. []


HENT: Normocephalic, atraumatic, bilateral external ears normal, no trismus nose

 normal. []


Eyes: PERRLA, EOMI, conjunctiva normal, no discharge. [] 


Neck: Normal range of motion, no tenderness, supple, no stridor. [] 


Cardiovascular:Heart rate regular rhythm, peripheral pulse intact cap refill is 

brisk


Lungs & Thorax:  Bilateral breath sounds clear, no respiratory distress


Abdomen: Soft, diffuse tenderness without guarding or rebound, no masses, no 

pulsatile masses


Skin: Warm, dry, no erythema, no rash. [] 


Back: No tenderness, no CVA tenderness. [] 


Extremities: No tenderness, no cyanosis, no clubbing, ROM intact, no edema. [] 


Neurologic: Alert and oriented X 3, normal motor function, normal sensory 

function, no focal deficits noted. []


Psychologic: Affect normal, judgement normal, mood normal. []


 (ALFRED CALLEJAS MD)





EKG:


EKG:


[]


 (ALFRED CALLEJAS MD)





Radiology/Procedures:


Radiology/Procedures:


[]


 (ALFRED CALLEJAS MD)





Course & Med Decision Making:


Course & Med Decision Making


Pertinent Labs and Imaging studies reviewed. (See chart for details)





[] 20-year-old female presents with abdominal pain.  Patient started her period 

reportedly yesterday.  Patient has a positive pregnancy test.  Quantitative hCG 

has been added on as well as an ABO Rh and a pelvic sonogram has been ordered.  

CT has been canceled, Bentyl has been canceled.  Care will be signed over to Dr. Barton with labs, ultrasound and disposition pending.


 (ALFRED CALLEJAS MD)





Dragon Disclaimer:


Dragon Disclaimer:


This electronic medical record was generated, in whole or in part, using a voice

 recognition dictation system.


 (ALFRED CALLEJAS MD)





Departure


Departure


Impression:  


   Primary Impression:  


   Ruptured ectopic pregnancy


   Additional Impression:  


   Hyperglycemia


Disposition:  09 ADMITTED AS INPT THIS HOSP


Condition:  STABLE


Referrals:  


NO PCP (PCP)











ALFRED CALLEJAS MD              Dec 10, 2020 05:19


EDDI MIRANDA DO               Dec 10, 2020 06:44

## 2020-12-10 NOTE — RAD
OB <14 WKS W/TV

 

History: Reason: abd pain, preg / Spl. Instructions:  / History: 

 

Comparison: None. 

 

Technique: Grayscale and color Doppler imaging of the pelvis was performed

using transabdominal technique.

 

Findings:

The uterus measures 9.8 x 4.2 x 5.1 cm. No evidence of intrauterine 

gestational sac.

 

Gestational sac within the right adnexa adjacent to the uterus likely 

within the dilated fallopian tube. Fetal pole is identified with 

crown-rump length 0.6 cm. Estimated gestational age by ultrasound 6 weeks 

3 days. Fetal cardiac activity is partially seen. Exact not identified.

 

There is moderate pelvic complicated fluid, likely hemoperitoneum.

 

Right ovary measures 5.1 x 2.8 x 3.2 cm. Dominant right ovarian follicle 

measures 2.9 cm. Left ovary measures 2.2 x 2.4 x 2.3 cm. Dominant left 

ovarian follicle measures 1.3 cm. Normal Doppler flow to the ovaries 

bilaterally.

 

No adnexal masses are seen.

 

IMPRESSION:

1.  Right-sided ectopic pregnancy with complicated fluid in the pelvis 

concerning for ruptured ectopic pregnancy.

 

 

**********FOR INTERNAL CODING PURPOSES**********

 

Critical result:

 

Findings discussed with  ALFRED CALLEJAS at 12/10/2020 6:38 AM.

 

RESULT CODE: (C)  

 

Electronically signed by: Sang Cardenas DO (12/10/2020 6:38 AM) [facility]

## 2020-12-11 VITALS — DIASTOLIC BLOOD PRESSURE: 47 MMHG | SYSTOLIC BLOOD PRESSURE: 101 MMHG

## 2020-12-11 VITALS — DIASTOLIC BLOOD PRESSURE: 50 MMHG | SYSTOLIC BLOOD PRESSURE: 94 MMHG

## 2020-12-11 VITALS — DIASTOLIC BLOOD PRESSURE: 52 MMHG | SYSTOLIC BLOOD PRESSURE: 95 MMHG

## 2020-12-11 LAB
BASOPHILS # BLD AUTO: 0 X10^3/UL (ref 0–0.2)
BASOPHILS NFR BLD: 0 % (ref 0–3)
EOSINOPHIL NFR BLD: 0 % (ref 0–3)
EOSINOPHIL NFR BLD: 0 X10^3/UL (ref 0–0.7)
ERYTHROCYTE [DISTWIDTH] IN BLOOD BY AUTOMATED COUNT: 14.7 % (ref 11.5–14.5)
HCT VFR BLD CALC: 23.7 % (ref 36–47)
HGB BLD-MCNC: 8.3 G/DL (ref 12–15.5)
LYMPHOCYTES # BLD: 2.2 X10^3/UL (ref 1–4.8)
LYMPHOCYTES NFR BLD AUTO: 29 % (ref 24–48)
MCH RBC QN AUTO: 31 PG (ref 25–35)
MCHC RBC AUTO-ENTMCNC: 35 G/DL (ref 31–37)
MCV RBC AUTO: 87 FL (ref 79–100)
MONO #: 0.7 X10^3/UL (ref 0–1.1)
MONOCYTES NFR BLD: 9 % (ref 0–9)
NEUT #: 4.7 X10^3/UL (ref 1.8–7.7)
NEUTROPHILS NFR BLD AUTO: 62 % (ref 31–73)
PLATELET # BLD AUTO: 200 X10^3/UL (ref 140–400)
RBC # BLD AUTO: 2.72 X10^6/UL (ref 3.5–5.4)
WBC # BLD AUTO: 7.6 X10^3/UL (ref 4–11)

## 2020-12-11 RX ADMIN — KETOROLAC TROMETHAMINE PRN MG: 30 INJECTION, SOLUTION INTRAMUSCULAR at 05:54

## 2020-12-11 RX ADMIN — OXYCODONE HYDROCHLORIDE AND ACETAMINOPHEN PRN TAB: 5; 325 TABLET ORAL at 05:54

## 2020-12-11 RX ADMIN — OXYCODONE HYDROCHLORIDE AND ACETAMINOPHEN PRN TAB: 5; 325 TABLET ORAL at 12:21

## 2020-12-11 RX ADMIN — GABAPENTIN SCH MG: 300 CAPSULE ORAL at 05:54

## 2020-12-11 NOTE — PDOC
SURGICAL PROGRESS NOTE


DATE: 12/11/20 


TIME: 11:36


Subjective


Pt. feeling well.  No complaints.


Vital Signs





Vital Signs








  Date Time  Temp Pulse Resp B/P (MAP) Pulse Ox O2 Delivery O2 Flow Rate FiO2


 


12/11/20 06:05 97.9 75 18 94/50 (65)  Room Air  





 97.9       


 


12/11/20 01:18     98   


 


12/10/20 09:35       8.0 








I&O











Intake and Output 


 


 12/11/20





 07:00


 


Intake Total 2065 ml


 


Output Total 1500 ml


 


Balance 565 ml


 


 


 


Intake IV Total 1600 ml


 


Blood Product IV Normal Saline Flush 465 ml


 


Output Urine Total 500 ml


 


Estimated Blood Loss 1000 ml








PATIENT HAS A MEDINA:  No


General:  Alert, Oriented X3, Cooperative


HEENT:  Atraumatic


Lungs:  Clear to auscultation


Heart:  Regular rate


Abdomen:  Normal bowel sounds, Soft


Psych/Mental Status:  Mental status NL


Labs





Laboratory Tests








Test


 12/10/20


05:10 12/10/20


05:21 12/10/20


05:35 12/10/20


07:00


 


Urine Collection Type Void    


 


Urine Color Yellow    


 


Urine Clarity Clear    


 


Urine pH 5.5 (<5.0-8.0)    


 


Urine Specific Gravity


 1.015


(1.000-1.030) 


 


 





 


Urine Protein


 30 mg/dL


(NEG-TRACE) 


 


 





 


Urine Glucose (UA)


 Negative mg/dL


(NEG) 


 


 





 


Urine Ketones (Stick)


 Negative mg/dL


(NEG) 


 


 





 


Urine Blood Large (NEG)    


 


Urine Nitrite Positive (NEG)    


 


Urine Bilirubin Negative (NEG)    


 


Urine Urobilinogen Dipstick


 0.2 mg/dL (0.2


mg/dL) 


 


 





 


Urine Leukocyte Esterase Small (NEG)    


 


Urine RBC


 20-40 /HPF


(0-2) 


 


 





 


Urine WBC


 20-40 /HPF


(0-4) 


 


 





 


Urine Squamous Epithelial


Cells Mod /LPF 


 


 


 





 


Urine Bacteria


 Moderate /HPF


(0-FEW) 


 


 





 


Urine Mucus Slight /LPF    


 


Bedside Urine HCG, Qualitative


 


 Hcg positive


(Negative) 


 





 


White Blood Count


 


 


 15.3 x10^3/uL


(4.0-11.0) 





 


Red Blood Count


 


 


 3.64 x10^6/uL


(3.50-5.40) 





 


Hemoglobin


 


 


 10.9 g/dL


(12.0-15.5) 





 


Hematocrit


 


 


 32.5 %


(36.0-47.0) 





 


Mean Corpuscular Volume   89 fL ()  


 


Mean Corpuscular Hemoglobin   30 pg (25-35)  


 


Mean Corpuscular Hemoglobin


Concent 


 


 34 g/dL


(31-37) 





 


Red Cell Distribution Width


 


 


 16.1 %


(11.5-14.5) 





 


Platelet Count


 


 


 492 x10^3/uL


(140-400) 





 


Neutrophils (%) (Auto)   59 % (31-73)  


 


Lymphocytes (%) (Auto)   33 % (24-48)  


 


Monocytes (%) (Auto)   7 % (0-9)  


 


Eosinophils (%) (Auto)   1 % (0-3)  


 


Basophils (%) (Auto)   0 % (0-3)  


 


Neutrophils # (Auto)


 


 


 9.1 x10^3/uL


(1.8-7.7) 





 


Lymphocytes # (Auto)


 


 


 5.0 x10^3/uL


(1.0-4.8) 





 


Monocytes # (Auto)


 


 


 1.0 x10^3/uL


(0.0-1.1) 





 


Eosinophils # (Auto)


 


 


 0.1 x10^3/uL


(0.0-0.7) 





 


Basophils # (Auto)


 


 


 0.1 x10^3/uL


(0.0-0.2) 





 


Maternal Serum HCG Beta


Subunit 


 


 22336 mIU/mL


(0-5) 





 


Sodium Level


 


 


 140 mmol/L


(136-145) 





 


Potassium Level


 


 


 3.3 mmol/L


(3.5-5.1) 





 


Chloride Level


 


 


 103 mmol/L


() 





 


Carbon Dioxide Level


 


 


 25 mmol/L


(21-32) 





 


Anion Gap   12 (6-14)  


 


Blood Urea Nitrogen   7 mg/dL (7-20)  


 


Creatinine


 


 


 0.7 mg/dL


(0.6-1.0) 





 


Estimated GFR


(Cockcroft-Gault) 


 


 105.6 


 





 


BUN/Creatinine Ratio   10 (6-20)  


 


Glucose Level


 


 


 209 mg/dL


(70-99) 





 


Calcium Level


 


 


 8.4 mg/dL


(8.5-10.1) 





 


Total Bilirubin


 


 


 0.2 mg/dL


(0.2-1.0) 





 


Aspartate Amino Transf


(AST/SGOT) 


 


 12 U/L (15-37) 


 





 


Alanine Aminotransferase


(ALT/SGPT) 


 


 13 U/L (14-59) 


 





 


Alkaline Phosphatase


 


 


 42 U/L


() 





 


Total Protein


 


 


 7.0 g/dL


(6.4-8.2) 





 


Albumin


 


 


 3.3 g/dL


(3.4-5.0) 





 


Albumin/Globulin Ratio   0.9 (1.0-1.7)  


 


Lipase


 


 


 75 U/L


() 





 


SARS-CoV-2 Antigen (Rapid)


 


 


 


 Negative


(NEGATIVE)


 


Test


 12/10/20


18:45 12/11/20


08:35 


 





 


Hemoglobin


 10.0 g/dL


(12.0-15.5) 8.3 g/dL


(12.0-15.5) 


 





 


Hematocrit


 28.5 %


(36.0-47.0) 23.7 %


(36.0-47.0) 


 





 


Mean Corpuscular Hemoglobin


Concent 35 g/dL


(31-37) 35 g/dL


(31-37) 


 





 


White Blood Count


 


 7.6 x10^3/uL


(4.0-11.0) 


 





 


Red Blood Count


 


 2.72 x10^6/uL


(3.50-5.40) 


 





 


Mean Corpuscular Volume  87 fL ()   


 


Mean Corpuscular Hemoglobin  31 pg (25-35)   


 


Red Cell Distribution Width


 


 14.7 %


(11.5-14.5) 


 





 


Platelet Count


 


 200 x10^3/uL


(140-400) 


 





 


Neutrophils (%) (Auto)  62 % (31-73)   


 


Lymphocytes (%) (Auto)  29 % (24-48)   


 


Monocytes (%) (Auto)  9 % (0-9)   


 


Eosinophils (%) (Auto)  0 % (0-3)   


 


Basophils (%) (Auto)  0 % (0-3)   


 


Neutrophils # (Auto)


 


 4.7 x10^3/uL


(1.8-7.7) 


 





 


Lymphocytes # (Auto)


 


 2.2 x10^3/uL


(1.0-4.8) 


 





 


Monocytes # (Auto)


 


 0.7 x10^3/uL


(0.0-1.1) 


 





 


Eosinophils # (Auto)


 


 0.0 x10^3/uL


(0.0-0.7) 


 





 


Basophils # (Auto)


 


 0.0 x10^3/uL


(0.0-0.2) 


 











Laboratory Tests








Test


 12/10/20


18:45 12/11/20


08:35


 


Hemoglobin


 10.0 g/dL


(12.0-15.5) 8.3 g/dL


(12.0-15.5)


 


Hematocrit


 28.5 %


(36.0-47.0) 23.7 %


(36.0-47.0)


 


Mean Corpuscular Hemoglobin


Concent 35 g/dL


(31-37) 35 g/dL


(31-37)


 


White Blood Count


 


 7.6 x10^3/uL


(4.0-11.0)


 


Red Blood Count


 


 2.72 x10^6/uL


(3.50-5.40)


 


Mean Corpuscular Volume  87 fL () 


 


Mean Corpuscular Hemoglobin  31 pg (25-35) 


 


Red Cell Distribution Width


 


 14.7 %


(11.5-14.5)


 


Platelet Count


 


 200 x10^3/uL


(140-400)


 


Neutrophils (%) (Auto)  62 % (31-73) 


 


Lymphocytes (%) (Auto)  29 % (24-48) 


 


Monocytes (%) (Auto)  9 % (0-9) 


 


Eosinophils (%) (Auto)  0 % (0-3) 


 


Basophils (%) (Auto)  0 % (0-3) 


 


Neutrophils # (Auto)


 


 4.7 x10^3/uL


(1.8-7.7)


 


Lymphocytes # (Auto)


 


 2.2 x10^3/uL


(1.0-4.8)


 


Monocytes # (Auto)


 


 0.7 x10^3/uL


(0.0-1.1)


 


Eosinophils # (Auto)


 


 0.0 x10^3/uL


(0.0-0.7)


 


Basophils # (Auto)


 


 0.0 x10^3/uL


(0.0-0.2)








Problem List


Problems


Medical Problems:


(1) Hyperglycemia


Status: Acute  





(2) Pelvic pain


Status: Acute  





(3) Pregnancy


Status: Acute  





(4) Ruptured ectopic pregnancy


Status: Acute  








Assessment/Plan


POD#1 s/p [facility] Right Salpingectomy


P: D/c home.





Justicifation of Admission Dx:


Justifications for Admission:


Justification of Admission Dx:  N/A











DEEPALI UGARTE Jr, MD          Dec 11, 2020 11:37

## 2020-12-11 NOTE — DISCH
DISCHARGE INSTRUCTIONS


Condition on Discharge


Condition on Discharge:  Stable





Activity After Discharge


Activity Instructions for Disc:  Activity as tolerated


Lifting Instructions after Dis:  No heavy lifting


Driving Instructions after Dis:  Do not drive today





Diet after Discharge


Diet after Discharge:  Regular





Contacting the DRYu after DC


Call your doctor for:  Concerns you may have





Follow-Up


Follow up with:  Dr. Farias in 2 wkw











DEEPALI FARIAS Jr, MD          Dec 11, 2020 11:40

## 2020-12-15 NOTE — PATHOLOGY
[facility] Accession Number: [id number]

.                                                                01

Material submitted:                                        .

fallopian tube - RIGHT FALLOPIAN TUBE WITH ECTOPIC PREGNANCY. Modifiers:

right

.                                                                01

Clinical history:                                          .

RUPTURED ECTOPIC

.                                                                02

**********************************************************************

Diagnosis:

Fallopian tube, right salpingectomy:

- Ectopic tubal pregnancy.

- Hematosalpinx.

- Paratubal cyst.

(JPM:pit 12/14/2020)

QTP  12/14/2020  1725 Local

**********************************************************************

.                                                                02

Electronically signed:                                     .

Codey Read MD, Pathologist

NPI- [id number]

.                                                                01

Gross description:                                         .

The specimen is received in formalin, labeled "Cast[place], Sar[place], right

fallopian tube with ectopic pregnancy" and consists of a focally disrupted

fimbriated fallopian tube segment measuring 6.5 cm in length and ranging

from 0.7-1.3 cm in diameter.  There is a 2.3 x 1.6 cm paratubal cyst

devoid of contents showing a smooth lining.  Sectioning shows a dilated

lumen contains clot and possible brown tissue.  Representative sections to

include the entire dilated aspect with clot are submitted in A1-A4.

(SDY; 12/12/2020)

SYU/SYU  12/12/2020  0930 Local

.                                                                02

Pathologist provided ICD-10:

N83.6, N83.8

.                                                                02

CPT                                                        .

[id number]

Specimen Comment: A courtesy copy of this report has been sent to [phone]

Specimen Comment: Report sent to 

***Performed at:  01

   [facility]

   [address], [place], [place]  [id number]

   MD Ascencion Snyder MD Phone:  [phone]

***Performed at:  02

   [facility]

   [address], [place]  [id number]

   MD Codey Read MD Phone:  [phone]

## 2020-12-30 ENCOUNTER — HOSPITAL ENCOUNTER (EMERGENCY)
Dept: HOSPITAL 61 - ER | Age: 20
Discharge: HOME | End: 2020-12-30
Payer: SELF-PAY

## 2020-12-30 VITALS — SYSTOLIC BLOOD PRESSURE: 132 MMHG | DIASTOLIC BLOOD PRESSURE: 88 MMHG

## 2020-12-30 VITALS — HEIGHT: 63 IN | BODY MASS INDEX: 26.56 KG/M2 | WEIGHT: 149.91 LBS

## 2020-12-30 DIAGNOSIS — N39.0: Primary | ICD-10-CM

## 2020-12-30 LAB
AMORPH SED URNS QL MICRO: PRESENT /HPF
APTT PPP: YELLOW S
BACTERIA #/AREA URNS HPF: (no result) /HPF
BILIRUB UR QL STRIP: NEGATIVE
FIBRINOGEN PPP-MCNC: CLEAR MG/DL
NITRITE UR QL STRIP: POSITIVE
PH UR STRIP: 6 [PH]
PROT UR STRIP-MCNC: NEGATIVE MG/DL
RBC #/AREA URNS HPF: (no result) /HPF (ref 0–2)
UROBILINOGEN UR-MCNC: 0.2 MG/DL
WBC #/AREA URNS HPF: (no result) /HPF (ref 0–4)

## 2020-12-30 PROCEDURE — 99283 EMERGENCY DEPT VISIT LOW MDM: CPT

## 2020-12-30 PROCEDURE — 81025 URINE PREGNANCY TEST: CPT

## 2020-12-30 PROCEDURE — 87086 URINE CULTURE/COLONY COUNT: CPT

## 2020-12-30 PROCEDURE — 81001 URINALYSIS AUTO W/SCOPE: CPT

## 2020-12-30 NOTE — PHYS DOC
Past Medical History


Past Medical History:  No Pertinent History


Past Surgical History:  No Surgical History


Smoking Status:  Never Smoker


Alcohol Use:  None


Drug Use:  None





General Adult


EDM:


Chief Complaint:  ABDOMINAL PAIN IN PREGNANCY





HPI:


HPI:





Patient is a 20  year old female  1 para 0 who presents to the ED today 

stating she thinks she is pregnant and is having another ectopic pregnancy.  

Patient states she has had intermittent slight pelvic pain since this morning.  

She states she had an ectopic pregnancy on 2020 and had right 

fallopian tube removed.  She states she was told not to have any sex for 6 

weeks.  She states she has been having unprotected sex and believes she is 

pregnant again.  She states she thinks she is having an ectopic pregnancy and 

would like to be evaluated for that.  Denies any concerns for STDs.  Denies any 

unusual vaginal discharge.





Review of Systems:


Review of Systems:


Constitutional:   Denies fever or chills. []


Eyes:   Denies change in visual acuity. []


HENT:   Denies nasal congestion or sore throat. [] 


Respiratory:   Denies cough or shortness of breath. [] 


Cardiovascular:   Denies chest pain or edema. [] 


GI: Reports pelvic pain and concern for ectopic pregnancy, denies nausea, 

vomiting, bloody stools or diarrhea. [] 


:  Denies dysuria. [] 


Musculoskeletal:   Denies back pain or joint pain. [] 


Integument:   Denies rash. [] 


Neurologic:   Denies headache, focal weakness or sensory changes. [] 


Psychiatric:  Denies depression or anxiety. []





Heart Score:


Risk Factors:


Risk Factors:  DM, Current or recent (<one month) smoker, HTN, HLP, family 

history of CAD, obesity.


Risk Scores:


Score 0 - 3:  2.5% MACE over next 6 weeks - Discharge Home


Score 4 - 6:  20.3% MACE over next 6 weeks - Admit for Clinical Observation


Score 7 - 10:  72.7% MACE over next 6 weeks - Early Invasive Strategies





Allergies:


Allergies:





Allergies








Coded Allergies Type Severity Reaction Last Updated Verified


 


  No Known Drug Allergies    4/20/15 No











Physical Exam:


PE:





Constitutional: Well developed, well nourished, no acute distress, non-toxic 

appearance. []


HENT: Normocephalic, atraumatic, bilateral external ears normal, oropharynx 

moist, no oral exudates, nose normal. []


Eyes: PERRLA, EOMI, conjunctiva normal, no discharge. [] 


Neck: Normal range of motion, no tenderness, supple, no stridor. [] 


Cardiovascular:Heart rate regular rhythm, no murmur []


Lungs & Thorax:  Bilateral breath sounds clear to auscultation []


Abdomen: Bowel sounds normal, soft, no tenderness, no masses, no pulsatile 

masses. [] 


Skin: Warm, dry, no erythema, no rash. [] 


Back: No tenderness, no CVA tenderness. [] 


Extremities: No tenderness, no cyanosis, no clubbing, ROM intact, no edema. [] 


Neurologic: Alert and oriented X 3, normal motor function, normal sensory 

function, no focal deficits noted. []


Psychologic: Affect normal, judgement normal, mood normal. []





Current Patient Data:


Labs:





                                Laboratory Tests








Test


 20


19:50 20


19:55


 


Urine Collection Type Void   


 


Urine Color Yellow   


 


Urine Clarity Clear   


 


Urine pH


 6.0 (<5.0-8.0)


 





 


Urine Specific Gravity


 1.020


(1.000-1.030) 





 


Urine Protein


 Negative mg/dL


(NEG-TRACE) 





 


Urine Glucose (UA)


 Negative mg/dL


(NEG) 





 


Urine Ketones (Stick)


 Negative mg/dL


(NEG) 





 


Urine Blood Trace (NEG)   


 


Urine Nitrite


 Positive (NEG)


 





 


Urine Bilirubin


 Negative (NEG)


 





 


Urine Urobilinogen Dipstick


 0.2 mg/dL (0.2


mg/dL) 





 


Urine Leukocyte Esterase Small (NEG)   


 


Urine RBC


 Occ /HPF (0-2)


 





 


Urine WBC


 5-10 /HPF


(0-4) 





 


Urine Squamous Epithelial


Cells Mod /LPF  


 





 


Urine Amorphous Sediment Present /HPF   


 


Urine Bacteria


 Few /HPF


(0-FEW) 





 


Urine Mucus Mod /LPF   


 


POC Urine HCG, Qualitative


 


 Hcg negative


(Negative)











EKG:


EKG:


[]





Radiology/Procedures:


Radiology/Procedures:


[]





Course & Med Decision Making:


Course & Med Decision Making


Pertinent Labs and Imaging studies reviewed. (See chart for details)





This is a 20-year-old female patient  1 para 0 who had an ectopic 

pregnancy on 2020 presenting today concerned she could have another 

ectopic pregnancy.  She is complaining of pelvic pain.  Denies concerns for 

STDs.  Negative urine hCG.  Positive for UTI.  Discharged on cephalexin.  

Encouraged to consider not having sexual intercourse until she hits the 6-week 

crystal from the date she had ectopic pregnancy surgery.





Dragon Disclaimer:


Dragon Disclaimer:


This electronic medical record was generated, in whole or in part, using a voice

 recognition dictation system.





Departure


Departure


Impression:  


   Primary Impression:  


   Urinary tract infection


   Qualified Codes:  N39.0 - Urinary tract infection, site not specified


   Additional Impression:  


   Negative pregnancy test


Disposition:   DC HOME SELF CARE/HOMELESS


Condition:  STABLE


Referrals:  


NO PCP (PCP)








DEEPALI UGARTE Jr, MD


follow up in one week


Patient Instructions:  Urinary Tract Infection





Additional Instructions:  


You were evaluated in the emergency room, your pregnancy test is negative.  You 

have urinary tract infection.  We will send you home with antibiotics.  Take 

them until completed.  Consider not having any intercourse until you hit the 6 

weeks crystal from 2020.  Follow-up with your OB/GYN in 1 week


Scripts


Cephalexin (CEPHALEXIN) 500 Mg Tablet


1 TAB PO BID, #14 TAB


   Prov: VINNY MCNEILL         20











VINNY MCNEILL              Dec 30, 2020 20:28